# Patient Record
Sex: MALE | Race: OTHER | HISPANIC OR LATINO | URBAN - METROPOLITAN AREA
[De-identification: names, ages, dates, MRNs, and addresses within clinical notes are randomized per-mention and may not be internally consistent; named-entity substitution may affect disease eponyms.]

---

## 2019-07-17 ENCOUNTER — EMERGENCY (EMERGENCY)
Facility: HOSPITAL | Age: 34
LOS: 1 days | Discharge: HOME | End: 2019-07-17
Attending: STUDENT IN AN ORGANIZED HEALTH CARE EDUCATION/TRAINING PROGRAM
Payer: COMMERCIAL

## 2019-07-17 VITALS
HEIGHT: 70 IN | HEART RATE: 93 BPM | DIASTOLIC BLOOD PRESSURE: 88 MMHG | SYSTOLIC BLOOD PRESSURE: 118 MMHG | WEIGHT: 175.93 LBS | OXYGEN SATURATION: 97 % | TEMPERATURE: 98 F | RESPIRATION RATE: 18 BRPM

## 2019-07-17 PROCEDURE — 99282 EMERGENCY DEPT VISIT SF MDM: CPT

## 2019-07-17 PROCEDURE — 99283 EMERGENCY DEPT VISIT LOW MDM: CPT

## 2019-07-17 NOTE — ED PROVIDER NOTE - CLINICAL SUMMARY MEDICAL DECISION MAKING FREE TEXT BOX
33 M p/w isolated fever overnight was 102 F tmax took tylenol improved, worked 11 days straight as double shifts. Pt reports that he has no cp, sob, lightheadedness, nausea, vomiting, rashes, travel to foreign country, dysuria, diarrhea, constipation, hematuria, abdominal pain.  He told his supervisor at work that he felt sick, who sent him to the ER. Denies throat pain, nasal congestion, cough  On exam, pt is well appearing, has no focus for the infection. clear lungs bilaterally soft abdomen, no lower extremity edema,   Pt was counselled that fever, could indicate that he has a virus, bacterial infection or less likely fungal infection. It could be a harbinger for HIV. Pt isn't interested in testing today. He was counselled to return if he develops fever and any other symptom (he has no hx of indwelling devices, for occult bacteremia, he has no cancer hx, no hx of immunocompromised state) he will have outpt follow up

## 2019-07-17 NOTE — ED PROVIDER NOTE - OBJECTIVE STATEMENT
32 yo otherwise healthy male presents to the ED c/o isolated fever 102 last night w/ subjective chills. Chills have persisted into the morning. Pt works here for environmental services and was advised to come to ED for eval when he arrived at work. Pt only endorsing chills at this time. No fever this morning. Denies cough, sob, recent travel, neck pain/stiffness, dizziness, n/v/d, chest pain, speech/visual changes, abdominal pain, urinary urgency, frequency, dysuria.

## 2019-07-17 NOTE — ED ADULT NURSE NOTE - OBJECTIVE STATEMENT
33 year old male a/ox3 ambulatory with no significant pmh presenting to ed with fever/chills and body aches since last night. fever last night 102, took tylenol this morning and last night. 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 denies any sob/dyspnea/cough/abd pain. no change in bowel/bladder. skin dry warm intact tena equally.

## 2019-07-17 NOTE — ED PROVIDER NOTE - NSFOLLOWUPINSTRUCTIONS_ED_ALL_ED_FT
1. Follow up with your PMD within 1-2 days.   2. Rest, increase fluids. Take tylenol 650mg every 6 hours for pain or temp greater than 99.9. Keep self hydration. Cool moist air humidifier at night time.   3. Worsening or continued fever, chills, weakness, nausea, vomiting, abdominal pain return to ER

## 2020-04-26 ENCOUNTER — MESSAGE (OUTPATIENT)
Age: 35
End: 2020-04-26

## 2020-06-03 ENCOUNTER — TRANSCRIPTION ENCOUNTER (OUTPATIENT)
Age: 35
End: 2020-06-03